# Patient Record
Sex: FEMALE | Race: WHITE | NOT HISPANIC OR LATINO | Employment: UNEMPLOYED | URBAN - METROPOLITAN AREA
[De-identification: names, ages, dates, MRNs, and addresses within clinical notes are randomized per-mention and may not be internally consistent; named-entity substitution may affect disease eponyms.]

---

## 2024-10-13 ENCOUNTER — APPOINTMENT (OUTPATIENT)
Dept: CARDIOLOGY | Facility: HOSPITAL | Age: 24
End: 2024-10-13

## 2024-10-13 ENCOUNTER — HOSPITAL ENCOUNTER (EMERGENCY)
Facility: HOSPITAL | Age: 24
Discharge: HOME | End: 2024-10-13

## 2024-10-13 ENCOUNTER — APPOINTMENT (OUTPATIENT)
Dept: RADIOLOGY | Facility: HOSPITAL | Age: 24
End: 2024-10-13

## 2024-10-13 VITALS
HEART RATE: 90 BPM | DIASTOLIC BLOOD PRESSURE: 78 MMHG | TEMPERATURE: 96.1 F | HEIGHT: 62 IN | RESPIRATION RATE: 20 BRPM | SYSTOLIC BLOOD PRESSURE: 121 MMHG | BODY MASS INDEX: 53.92 KG/M2 | OXYGEN SATURATION: 100 % | WEIGHT: 293 LBS

## 2024-10-13 DIAGNOSIS — R79.89 ELEVATED TSH: ICD-10-CM

## 2024-10-13 DIAGNOSIS — R00.2 PALPITATIONS: Primary | ICD-10-CM

## 2024-10-13 LAB
ANION GAP SERPL CALCULATED.3IONS-SCNC: 13 MMOL/L (ref 10–20)
B-HCG SERPL-ACNC: <2 MIU/ML
BASOPHILS # BLD AUTO: 0.04 X10*3/UL (ref 0–0.1)
BASOPHILS NFR BLD AUTO: 0.4 %
BUN SERPL-MCNC: 8 MG/DL (ref 6–23)
CALCIUM SERPL-MCNC: 9.7 MG/DL (ref 8.6–10.3)
CARDIAC TROPONIN I PNL SERPL HS: <3 NG/L (ref 0–13)
CHLORIDE SERPL-SCNC: 102 MMOL/L (ref 98–107)
CO2 SERPL-SCNC: 28 MMOL/L (ref 21–32)
CREAT SERPL-MCNC: 0.51 MG/DL (ref 0.5–1.05)
D DIMER PPP FEU-MCNC: 0.24 MG/L FEU (ref 0.19–0.5)
EGFRCR SERPLBLD CKD-EPI 2021: >90 ML/MIN/1.73M*2
EOSINOPHIL # BLD AUTO: 0.15 X10*3/UL (ref 0–0.7)
EOSINOPHIL NFR BLD AUTO: 1.4 %
ERYTHROCYTE [DISTWIDTH] IN BLOOD BY AUTOMATED COUNT: 13.1 % (ref 11.5–14.5)
GLUCOSE SERPL-MCNC: 108 MG/DL (ref 74–99)
HCT VFR BLD AUTO: 46.2 % (ref 36–46)
HGB BLD-MCNC: 15.6 G/DL (ref 12–16)
IMM GRANULOCYTES # BLD AUTO: 0.04 X10*3/UL (ref 0–0.7)
IMM GRANULOCYTES NFR BLD AUTO: 0.4 % (ref 0–0.9)
LYMPHOCYTES # BLD AUTO: 2.82 X10*3/UL (ref 1.2–4.8)
LYMPHOCYTES NFR BLD AUTO: 26.6 %
MCH RBC QN AUTO: 29.1 PG (ref 26–34)
MCHC RBC AUTO-ENTMCNC: 33.8 G/DL (ref 32–36)
MCV RBC AUTO: 86 FL (ref 80–100)
MONOCYTES # BLD AUTO: 0.77 X10*3/UL (ref 0.1–1)
MONOCYTES NFR BLD AUTO: 7.3 %
NEUTROPHILS # BLD AUTO: 6.8 X10*3/UL (ref 1.2–7.7)
NEUTROPHILS NFR BLD AUTO: 63.9 %
NRBC BLD-RTO: 0 /100 WBCS (ref 0–0)
PLATELET # BLD AUTO: 348 X10*3/UL (ref 150–450)
POTASSIUM SERPL-SCNC: 3.8 MMOL/L (ref 3.5–5.3)
RBC # BLD AUTO: 5.37 X10*6/UL (ref 4–5.2)
SODIUM SERPL-SCNC: 139 MMOL/L (ref 136–145)
T4 FREE SERPL-MCNC: 0.78 NG/DL (ref 0.61–1.12)
TSH SERPL-ACNC: 7.72 MIU/L (ref 0.44–3.98)
WBC # BLD AUTO: 10.6 X10*3/UL (ref 4.4–11.3)

## 2024-10-13 PROCEDURE — 36415 COLL VENOUS BLD VENIPUNCTURE: CPT

## 2024-10-13 PROCEDURE — 85300 ANTITHROMBIN III ACTIVITY: CPT

## 2024-10-13 PROCEDURE — 84702 CHORIONIC GONADOTROPIN TEST: CPT

## 2024-10-13 PROCEDURE — 2500000004 HC RX 250 GENERAL PHARMACY W/ HCPCS (ALT 636 FOR OP/ED)

## 2024-10-13 PROCEDURE — 96374 THER/PROPH/DIAG INJ IV PUSH: CPT

## 2024-10-13 PROCEDURE — 99284 EMERGENCY DEPT VISIT MOD MDM: CPT | Mod: 25

## 2024-10-13 PROCEDURE — 84484 ASSAY OF TROPONIN QUANT: CPT

## 2024-10-13 PROCEDURE — 85025 COMPLETE CBC W/AUTO DIFF WBC: CPT

## 2024-10-13 PROCEDURE — 71046 X-RAY EXAM CHEST 2 VIEWS: CPT

## 2024-10-13 PROCEDURE — 96361 HYDRATE IV INFUSION ADD-ON: CPT

## 2024-10-13 PROCEDURE — 84443 ASSAY THYROID STIM HORMONE: CPT

## 2024-10-13 PROCEDURE — 80048 BASIC METABOLIC PNL TOTAL CA: CPT

## 2024-10-13 PROCEDURE — 71046 X-RAY EXAM CHEST 2 VIEWS: CPT | Performed by: STUDENT IN AN ORGANIZED HEALTH CARE EDUCATION/TRAINING PROGRAM

## 2024-10-13 PROCEDURE — 2500000002 HC RX 250 W HCPCS SELF ADMINISTERED DRUGS (ALT 637 FOR MEDICARE OP, ALT 636 FOR OP/ED)

## 2024-10-13 PROCEDURE — 93005 ELECTROCARDIOGRAM TRACING: CPT

## 2024-10-13 PROCEDURE — 84439 ASSAY OF FREE THYROXINE: CPT

## 2024-10-13 RX ORDER — HYDROXYZINE PAMOATE 25 MG/1
25 CAPSULE ORAL ONCE
Status: COMPLETED | OUTPATIENT
Start: 2024-10-13 | End: 2024-10-13

## 2024-10-13 RX ORDER — LORAZEPAM 2 MG/ML
2 INJECTION INTRAMUSCULAR ONCE
Status: COMPLETED | OUTPATIENT
Start: 2024-10-13 | End: 2024-10-13

## 2024-10-13 ASSESSMENT — COLUMBIA-SUICIDE SEVERITY RATING SCALE - C-SSRS
1. IN THE PAST MONTH, HAVE YOU WISHED YOU WERE DEAD OR WISHED YOU COULD GO TO SLEEP AND NOT WAKE UP?: NO
2. HAVE YOU ACTUALLY HAD ANY THOUGHTS OF KILLING YOURSELF?: NO
6. HAVE YOU EVER DONE ANYTHING, STARTED TO DO ANYTHING, OR PREPARED TO DO ANYTHING TO END YOUR LIFE?: NO

## 2024-10-13 NOTE — ED PROVIDER NOTES
HPI   Chief Complaint   Patient presents with    Palpitations     Palpitations and dizziness. Increased but random over past week.  Pt symptoms increase with walking.         Patient is a 24-year-old female presenting with a chief complaint of palpitations.  Patient dates she is randomly had palpitations and some slight dizziness for the last 2 weeks, she believes there may be an anxiety component to this, she states that occasionally she will have ringing in her ears, patient states she feels very anxious at this moment, patient is has some chest discomfort that radiates into her ribs, patient has no current dizziness or blurred vision, no headache, no fevers or chills.  Patient does not take any medication daily.      History provided by:  Patient          Patient History   No past medical history on file.  Past Surgical History:   Procedure Laterality Date    TONSILLECTOMY  09/11/2017    Tonsillectomy With Adenoidectomy    TYMPANOSTOMY TUBE PLACEMENT  09/11/2017    Ear Pressure Equalization Tube     No family history on file.  Social History     Tobacco Use    Smoking status: Not on file    Smokeless tobacco: Not on file   Substance Use Topics    Alcohol use: Not on file    Drug use: Not on file       Physical Exam   ED Triage Vitals [10/13/24 0136]   Temperature Heart Rate Respirations BP   35.6 °C (96.1 °F) (!) 115 (!) 22 (!) 147/111      Pulse Ox Temp Source Heart Rate Source Patient Position   100 % Tympanic Monitor --      BP Location FiO2 (%)     -- --       Physical Exam  Vitals and nursing note reviewed. Exam conducted with a chaperone present.   Constitutional:       General: She is not in acute distress.     Appearance: Normal appearance. She is obese. She is not ill-appearing, toxic-appearing or diaphoretic.   HENT:      Head: Normocephalic and atraumatic.      Right Ear: Tympanic membrane, ear canal and external ear normal.      Left Ear: Tympanic membrane, ear canal and external ear normal.       Nose: Nose normal.      Mouth/Throat:      Mouth: Mucous membranes are moist.      Pharynx: Oropharynx is clear.   Eyes:      Extraocular Movements: Extraocular movements intact.      Conjunctiva/sclera: Conjunctivae normal.      Pupils: Pupils are equal, round, and reactive to light.   Cardiovascular:      Rate and Rhythm: Regular rhythm. Tachycardia present.      Pulses: Normal pulses.      Heart sounds: Normal heart sounds.   Pulmonary:      Breath sounds: Normal breath sounds.   Abdominal:      General: Abdomen is flat. Bowel sounds are normal.      Palpations: Abdomen is soft.   Musculoskeletal:         General: Normal range of motion.   Skin:     General: Skin is warm and dry.   Neurological:      General: No focal deficit present.      Mental Status: She is alert and oriented to person, place, and time. Mental status is at baseline.   Psychiatric:         Mood and Affect: Mood normal.         Behavior: Behavior normal.         Thought Content: Thought content normal.         Judgment: Judgment normal.           ED Course & [facility]   ED Course as of 10/13/24 2238   Sun Oct 13, 2024   0125 EKG interpreted by myself independently, EKG shows sinus tachycardia, rate of 107 bpm, UT interval 166, QRS 86, , QTc 461, patient has normal axis, no ST elevations or depressions, negative for acute MI. [HARRIS]      ED Course User Index  [HARRIS] Gregorio Worley, DO         Diagnoses as of 10/13/24 2238   Palpitations   Elevated TSH                 No data recorded     [place] Coma Scale Score: 15 (10/13/24 0140 : Angelica Calderón, HEVER)                           Medical Decision Making  Patient seen and evaluated at bedside, patient is in no acute distress.  I will order a CBC, CMP, troponin, D-dimer, TSH, chest x-ray. Differential diagnosis includes but is not limited to anxiety, electrolyte abnormality, ACS.  Patient CBC shows hemoglobin of 15.6 hematocrit 46.2, patient's TSH slightly elevated at 7.72, however thyroxine free within  normal limits, BMP shows no electrolyte abnormalities, D-dimer 0.24, pregnancy negative, patient's x-ray chest as below.  Discussed findings with the patient, advised her to follow-up with her primary care provider, after IV fluids, patient's tachycardia did resolve.  Return precautions were discussed with the patient, she is agreeable this discharge plan.    Diagnosis: Palpitations, elevated TSH  XR chest 2 views   Final Result    No acute cardiopulmonary process.                MACRO:    None.          Signed by: Sabino Evans 10/13/2024 3:36 AM    Dictation workstation:   [id number]     Results for orders placed or performed during the hospital encounter of 10/13/24  -hCG, quantitative, pregnancy:        Result                      Value             Ref Range           HCG, Beta-Quantitative      <2                <5 mIU/mL      -Basic metabolic panel:        Result                      Value             Ref Range           Glucose                     108 (H)           74 - 99 mg/dL       Sodium                      139               136 - 145 mm*       Potassium                   3.8               3.5 - 5.3 mm*       Chloride                    102               98 - 107 mmo*       Bicarbonate                 28                21 - 32 mmol*       Anion Gap                   13                10 - 20 mmol*       Urea Nitrogen               8                 6 - 23 mg/dL        Creatinine                  0.51              0.50 - 1.05 *       eGFR                        >90               >60 mL/min/1*       Calcium                     9.7               8.6 - 10.3 m*  -CBC and Auto Differential:        Result                      Value             Ref Range           WBC                         10.6              4.4 - 11.3 x*       nRBC                        0.0               0.0 - 0.0 /1*       RBC                         5.37 (H)          4.00 - 5.20 *       Hemoglobin                  15.6               12.0 - 16.0 *       Hematocrit                  46.2 (H)          36.0 - 46.0 %       MCV                         86                80 - 100 fL         MCH                         29.1              26.0 - 34.0 *       MCHC                        33.8              32.0 - 36.0 *       RDW                         13.1              11.5 - 14.5 %       Platelets                   348               150 - 450 x1*       Neutrophils %               63.9              40.0 - 80.0 %       Immature Granulocytes *     0.4               0.0 - 0.9 %         Lymphocytes %               26.6              13.0 - 44.0 %       Monocytes %                 7.3               2.0 - 10.0 %        Eosinophils %               1.4               0.0 - 6.0 %         Basophils %                 0.4               0.0 - 2.0 %         Neutrophils Absolute        6.80              1.20 - 7.70 *       Immature Granulocytes *     0.04              0.00 - 0.70 *       Lymphocytes Absolute        2.82              1.20 - 4.80 *       Monocytes Absolute          0.77              0.10 - 1.00 *       Eosinophils Absolute        0.15              0.00 - 0.70 *       Basophils Absolute          0.04              0.00 - 0.10 *  -D-dimer, quantitative:        Result                      Value             Ref Range           D-Dimer Non VTE, Quant*     0.24              0.19 - 0.50 *  -Troponin I, High Sensitivity:        Result                      Value             Ref Range           Troponin I, High Sensi*     <3                0 - 13 ng/L    -TSH with reflex to Free T4 if abnormal:        Result                      Value             Ref Range           Thyroid Stimulating Ho*     7.72 (H)          0.44 - 3.98 *  -Thyroxine, Free:        Result                      Value             Ref Range           Thyroxine, Free             0.78              0.61 - 1.12 *  I utilized an evidence-based risk rating tool (CMT) along with my training and experience to  weigh the risk of discharge against the risks of further testing, imaging, or hospitalization. At this time I estimate the risks of additional testing, imaging, or hospitalization to be equal to or greater than the risk of discharge. I discussed my risk assessment with the patient and the patient consents to the risk of discharge as well as the risk of uncertainty in estimating outcomes. [id number]       [image]          Procedure  Procedures    Sections of this report were created using voice-to-text technology and may contain errors in translation    Gregorio Worley DO  Emergency Medicine         Gregorio Worley DO  10/13/24 4639

## 2024-10-13 NOTE — Clinical Note
Sherice Brown was seen and treated in our emergency department on 10/13/2024.  She may return to work on 10/14/2024.       If you have any questions or concerns, please don't hesitate to call.      Gregorio Worley, DO

## 2024-10-13 NOTE — DISCHARGE INSTRUCTIONS
You are seen today for palpitations, your imaging and lab work are reassuring, please note that your TSH was slightly elevated, be beneficial for you to follow-up with your primary care provider for further evaluation, please return to the ER for worsening symptoms.

## 2024-10-14 ENCOUNTER — HOSPITAL ENCOUNTER (EMERGENCY)
Facility: HOSPITAL | Age: 24
Discharge: HOME | End: 2024-10-14
Attending: STUDENT IN AN ORGANIZED HEALTH CARE EDUCATION/TRAINING PROGRAM

## 2024-10-14 ENCOUNTER — APPOINTMENT (OUTPATIENT)
Dept: RADIOLOGY | Facility: HOSPITAL | Age: 24
End: 2024-10-14

## 2024-10-14 VITALS
OXYGEN SATURATION: 100 % | WEIGHT: 293 LBS | BODY MASS INDEX: 53.92 KG/M2 | HEART RATE: 105 BPM | RESPIRATION RATE: 18 BRPM | DIASTOLIC BLOOD PRESSURE: 87 MMHG | HEIGHT: 62 IN | TEMPERATURE: 97.2 F | SYSTOLIC BLOOD PRESSURE: 177 MMHG

## 2024-10-14 DIAGNOSIS — R10.9 FLANK PAIN: Primary | ICD-10-CM

## 2024-10-14 DIAGNOSIS — R42 DIZZINESS: ICD-10-CM

## 2024-10-14 DIAGNOSIS — F41.9 ANXIETY: ICD-10-CM

## 2024-10-14 LAB
ALBUMIN SERPL BCP-MCNC: 4.6 G/DL (ref 3.4–5)
ALP SERPL-CCNC: 100 U/L (ref 33–110)
ALT SERPL W P-5'-P-CCNC: 45 U/L (ref 7–45)
ANION GAP SERPL CALCULATED.3IONS-SCNC: 14 MMOL/L (ref 10–20)
APPEARANCE UR: CLEAR
AST SERPL W P-5'-P-CCNC: 26 U/L (ref 9–39)
BACTERIA #/AREA URNS AUTO: ABNORMAL /HPF
BASOPHILS # BLD AUTO: 0.02 X10*3/UL (ref 0–0.1)
BASOPHILS NFR BLD AUTO: 0.2 %
BILIRUB SERPL-MCNC: 0.6 MG/DL (ref 0–1.2)
BILIRUB UR STRIP.AUTO-MCNC: NEGATIVE MG/DL
BUN SERPL-MCNC: 7 MG/DL (ref 6–23)
CALCIUM SERPL-MCNC: 9.7 MG/DL (ref 8.6–10.3)
CARDIAC TROPONIN I PNL SERPL HS: <3 NG/L (ref 0–13)
CHLORIDE SERPL-SCNC: 102 MMOL/L (ref 98–107)
CO2 SERPL-SCNC: 27 MMOL/L (ref 21–32)
COLOR UR: ABNORMAL
CREAT SERPL-MCNC: 0.57 MG/DL (ref 0.5–1.05)
EGFRCR SERPLBLD CKD-EPI 2021: >90 ML/MIN/1.73M*2
EOSINOPHIL # BLD AUTO: 0.08 X10*3/UL (ref 0–0.7)
EOSINOPHIL NFR BLD AUTO: 0.8 %
ERYTHROCYTE [DISTWIDTH] IN BLOOD BY AUTOMATED COUNT: 13.2 % (ref 11.5–14.5)
GLUCOSE SERPL-MCNC: 89 MG/DL (ref 74–99)
GLUCOSE UR STRIP.AUTO-MCNC: NORMAL MG/DL
HCG UR QL IA.RAPID: NEGATIVE
HCT VFR BLD AUTO: 47.2 % (ref 36–46)
HGB BLD-MCNC: 15.8 G/DL (ref 12–16)
IMM GRANULOCYTES # BLD AUTO: 0.03 X10*3/UL (ref 0–0.7)
IMM GRANULOCYTES NFR BLD AUTO: 0.3 % (ref 0–0.9)
KETONES UR STRIP.AUTO-MCNC: NEGATIVE MG/DL
LEUKOCYTE ESTERASE UR QL STRIP.AUTO: NEGATIVE
LIPASE SERPL-CCNC: 14 U/L (ref 9–82)
LYMPHOCYTES # BLD AUTO: 2 X10*3/UL (ref 1.2–4.8)
LYMPHOCYTES NFR BLD AUTO: 20.1 %
MCH RBC QN AUTO: 28.9 PG (ref 26–34)
MCHC RBC AUTO-ENTMCNC: 33.5 G/DL (ref 32–36)
MCV RBC AUTO: 86 FL (ref 80–100)
MONOCYTES # BLD AUTO: 0.64 X10*3/UL (ref 0.1–1)
MONOCYTES NFR BLD AUTO: 6.4 %
MUCOUS THREADS #/AREA URNS AUTO: ABNORMAL /LPF
NEUTROPHILS # BLD AUTO: 7.19 X10*3/UL (ref 1.2–7.7)
NEUTROPHILS NFR BLD AUTO: 72.2 %
NITRITE UR QL STRIP.AUTO: NEGATIVE
NRBC BLD-RTO: 0 /100 WBCS (ref 0–0)
PH UR STRIP.AUTO: 5.5 [PH]
PLATELET # BLD AUTO: 371 X10*3/UL (ref 150–450)
POTASSIUM SERPL-SCNC: 3.8 MMOL/L (ref 3.5–5.3)
PROT SERPL-MCNC: 8.5 G/DL (ref 6.4–8.2)
PROT UR STRIP.AUTO-MCNC: NEGATIVE MG/DL
RBC # BLD AUTO: 5.46 X10*6/UL (ref 4–5.2)
RBC # UR STRIP.AUTO: ABNORMAL /UL
RBC #/AREA URNS AUTO: ABNORMAL /HPF
SODIUM SERPL-SCNC: 139 MMOL/L (ref 136–145)
SP GR UR STRIP.AUTO: 1.01
SQUAMOUS #/AREA URNS AUTO: ABNORMAL /HPF
UROBILINOGEN UR STRIP.AUTO-MCNC: NORMAL MG/DL
WBC # BLD AUTO: 10 X10*3/UL (ref 4.4–11.3)
WBC #/AREA URNS AUTO: ABNORMAL /HPF

## 2024-10-14 PROCEDURE — 85025 COMPLETE CBC W/AUTO DIFF WBC: CPT

## 2024-10-14 PROCEDURE — 96374 THER/PROPH/DIAG INJ IV PUSH: CPT | Mod: 59

## 2024-10-14 PROCEDURE — 36415 COLL VENOUS BLD VENIPUNCTURE: CPT

## 2024-10-14 PROCEDURE — 71275 CT ANGIOGRAPHY CHEST: CPT | Performed by: RADIOLOGY

## 2024-10-14 PROCEDURE — 99285 EMERGENCY DEPT VISIT HI MDM: CPT | Mod: 25

## 2024-10-14 PROCEDURE — 74177 CT ABD & PELVIS W/CONTRAST: CPT | Performed by: RADIOLOGY

## 2024-10-14 PROCEDURE — 2550000001 HC RX 255 CONTRASTS

## 2024-10-14 PROCEDURE — 84484 ASSAY OF TROPONIN QUANT: CPT

## 2024-10-14 PROCEDURE — 74177 CT ABD & PELVIS W/CONTRAST: CPT

## 2024-10-14 PROCEDURE — 80053 COMPREHEN METABOLIC PANEL: CPT

## 2024-10-14 PROCEDURE — 81025 URINE PREGNANCY TEST: CPT

## 2024-10-14 PROCEDURE — 83690 ASSAY OF LIPASE: CPT

## 2024-10-14 PROCEDURE — 81003 URINALYSIS AUTO W/O SCOPE: CPT

## 2024-10-14 PROCEDURE — 2500000004 HC RX 250 GENERAL PHARMACY W/ HCPCS (ALT 636 FOR OP/ED)

## 2024-10-14 PROCEDURE — 71275 CT ANGIOGRAPHY CHEST: CPT

## 2024-10-14 RX ORDER — KETOROLAC TROMETHAMINE 10 MG/1
10 TABLET, FILM COATED ORAL EVERY 6 HOURS PRN
Qty: 20 TABLET | Refills: 0 | Status: SHIPPED | OUTPATIENT
Start: 2024-10-14 | End: 2024-10-19

## 2024-10-14 RX ORDER — ALPRAZOLAM 0.5 MG/1
0.5 TABLET ORAL 3 TIMES DAILY PRN
Qty: 9 TABLET | Refills: 0 | Status: SHIPPED | OUTPATIENT
Start: 2024-10-14 | End: 2024-10-17

## 2024-10-14 RX ORDER — CYCLOBENZAPRINE HCL 10 MG
10 TABLET ORAL 2 TIMES DAILY PRN
Qty: 20 TABLET | Refills: 0 | Status: SHIPPED | OUTPATIENT
Start: 2024-10-14 | End: 2024-10-24

## 2024-10-14 RX ORDER — KETOROLAC TROMETHAMINE 30 MG/ML
15 INJECTION, SOLUTION INTRAMUSCULAR; INTRAVENOUS ONCE
Status: COMPLETED | OUTPATIENT
Start: 2024-10-14 | End: 2024-10-14

## 2024-10-14 RX ORDER — HYDROXYZINE HYDROCHLORIDE 25 MG/1
25 TABLET, FILM COATED ORAL EVERY 6 HOURS
Qty: 12 TABLET | Refills: 0 | Status: SHIPPED | OUTPATIENT
Start: 2024-10-14 | End: 2024-10-17

## 2024-10-14 ASSESSMENT — COLUMBIA-SUICIDE SEVERITY RATING SCALE - C-SSRS
1. IN THE PAST MONTH, HAVE YOU WISHED YOU WERE DEAD OR WISHED YOU COULD GO TO SLEEP AND NOT WAKE UP?: NO
6. HAVE YOU EVER DONE ANYTHING, STARTED TO DO ANYTHING, OR PREPARED TO DO ANYTHING TO END YOUR LIFE?: NO
2. HAVE YOU ACTUALLY HAD ANY THOUGHTS OF KILLING YOURSELF?: NO

## 2024-10-14 ASSESSMENT — PAIN SCALES - GENERAL: PAINLEVEL_OUTOF10: 4

## 2024-10-14 NOTE — Clinical Note
Sherice Brown was seen and treated in our emergency department on 10/14/2024.  She may return to work on 10/21/2024.       If you have any questions or concerns, please don't hesitate to call.      Katelynn Krishnamurthy MD

## 2024-10-14 NOTE — ED PROVIDER NOTES
HPI   Chief Complaint   Patient presents with    Dizziness    Flank Pain       HPI  Patient is a 24-year-old female who presents to ED for multiple complaints.  Patient states she is dizzy, has left-sided flank pain that radiates from the left side of her chest, generalized malaise.  Patient was evaluated in the ED yesterday for chief complaint of palpitations.  Patient had a full cardiac workup which was negative.  Patient was treated for anxiety and discharge.  Patient returns today with similar complaints.  She denies any nausea vomiting or diarrhea, urinary symptoms, headache or dizziness, fever chills, cough or congestion.  Denies any recent hospitalizations or surgeries.  Denies any recent travel or sick contacts.  Patient does have a history of anxiety.      Patient History   No past medical history on file.  Past Surgical History:   Procedure Laterality Date    TONSILLECTOMY  09/11/2017    Tonsillectomy With Adenoidectomy    TYMPANOSTOMY TUBE PLACEMENT  09/11/2017    Ear Pressure Equalization Tube     No family history on file.  Social History     Tobacco Use    Smoking status: Not on file    Smokeless tobacco: Not on file   Substance Use Topics    Alcohol use: Not on file    Drug use: Not on file       Physical Exam   ED Triage Vitals [10/14/24 1540]   Temperature Heart Rate Respirations BP   36.2 °C (97.2 °F) (!) 130 16 (!) 198/99      Pulse Ox Temp Source Heart Rate Source Patient Position   100 % Temporal Monitor;Brachial Sitting      BP Location FiO2 (%)     Left arm --       Physical Exam  Vitals reviewed.   Constitutional:       General: She is not in acute distress.     Appearance: Normal appearance. She is obese. She is not ill-appearing.   HENT:      Head: Normocephalic and atraumatic.   Eyes:      Extraocular Movements: Extraocular movements intact.   Cardiovascular:      Rate and Rhythm: Regular rhythm. Tachycardia present.      Heart sounds: Normal heart sounds.   Pulmonary:      Effort:  Pulmonary effort is normal.      Breath sounds: Normal breath sounds.   Abdominal:      General: Abdomen is flat.      Palpations: Abdomen is soft.      Tenderness: There is no abdominal tenderness.   Musculoskeletal:         General: Normal range of motion.      Cervical back: Normal range of motion and neck supple.   Skin:     General: Skin is warm and dry.   Neurological:      General: No focal deficit present.      Mental Status: She is alert and oriented to person, place, and time.   Psychiatric:         Mood and Affect: Mood normal.         Behavior: Behavior normal.           ED Course & MDM   Diagnoses as of 10/14/24 2126   Flank pain   Dizziness   Anxiety                 No data recorded     [place] Coma Scale Score: 15 (10/14/24 1540 : My Melgoza, RN)                           Medical Decision Making  Parts of this chart have been completed using voice recognition software. Please excuse any errors of transcription.  My thought process and reason for plan has been formulated from the time that I saw the patient until the time of disposition and is not specific to one specific moment during their visit and furthermore my MDM encompasses this entire chart and not only this text box.    HPI:   Detailed above.    Exam:   A medically appropriate exam performed, outlined above, given the known history and presentation.    History obtained from:   Patient    EKG/Cardiac monitor:   Interpreted by attending physician, see their note for ED course for more detail.    Social Determinants of Health considered during this visit:       Medications given during visit:  Medications   ketorolac (Toradol) injection 15 mg (15 mg intravenous Given 10/14/24 1605)   iohexol (OMNIPaque) 350 mg iodine/mL solution 75 mL (75 mL intravenous Given 10/14/24 1618)        Diagnostic/tests:  Labs Reviewed   CBC WITH AUTO DIFFERENTIAL - Abnormal       Result Value    WBC 10.0      nRBC 0.0      RBC 5.46 (*)     Hemoglobin 15.8       Hematocrit 47.2 (*)     MCV 86      MCH 28.9      MCHC 33.5      RDW 13.2      Platelets 371      Neutrophils % 72.2      Immature Granulocytes %, Automated 0.3      Lymphocytes % 20.1      Monocytes % 6.4      Eosinophils % 0.8      Basophils % 0.2      Neutrophils Absolute 7.19      Immature Granulocytes Absolute, Automated 0.03      Lymphocytes Absolute 2.00      Monocytes Absolute 0.64      Eosinophils Absolute 0.08      Basophils Absolute 0.02     COMPREHENSIVE METABOLIC PANEL - Abnormal    Glucose 89      Sodium 139      Potassium 3.8      Chloride 102      Bicarbonate 27      Anion Gap 14      Urea Nitrogen 7      Creatinine 0.57      eGFR >90      Calcium 9.7      Albumin 4.6      Alkaline Phosphatase 100      Total Protein 8.5 (*)     AST 26      Bilirubin, Total 0.6      ALT 45     URINALYSIS WITH REFLEX CULTURE AND MICROSCOPIC - Abnormal    Color, Urine Light-Yellow      Appearance, Urine Clear      Specific Gravity, Urine 1.011      pH, Urine 5.5      Protein, Urine NEGATIVE      Glucose, Urine Normal      Blood, Urine 1.0 (3+) (*)     Ketones, Urine NEGATIVE      Bilirubin, Urine NEGATIVE      Urobilinogen, Urine Normal      Nitrite, Urine NEGATIVE      Leukocyte Esterase, Urine NEGATIVE     URINALYSIS MICROSCOPIC WITH REFLEX CULTURE - Abnormal    WBC, Urine 1-5      RBC, Urine 1-2      Squamous Epithelial Cells, Urine 1-9 (SPARSE)      Bacteria, Urine 1+ (*)     Mucus, Urine FEW     LIPASE - Normal    Lipase 14      Narrative:     Venipuncture immediately after or during the administration of Metamizole may lead to falsely low results. Testing should be performed immediately prior to Metamizole dosing.   HCG, URINE, QUALITATIVE - Normal    HCG, Urine NEGATIVE     SERIAL TROPONIN-INITIAL - Normal    Troponin I, High Sensitivity <3      Narrative:     Less than 99th percentile of normal range cutoff-  Female and children under 18 years old <14 ng/L; Male <21 ng/L: Negative  Repeat testing should be  performed if clinically indicated.     Female and children under 18 years old 14-50 ng/L; Male 21-50 ng/L:  Consistent with possible cardiac damage and possible increased clinical   risk. Serial measurements may help to assess extent of myocardial damage.     >50 ng/L: Consistent with cardiac damage, increased clinical risk and  myocardial infarction. Serial measurements may help assess extent of   myocardial damage.      NOTE: Children less than 1 year old may have higher baseline troponin   levels and results should be interpreted in conjunction with the overall   clinical context.     NOTE: Troponin I testing is performed using a different   testing methodology at [facility] than at other   [facility]. Direct result comparisons should only   be made within the same method.   TROPONIN SERIES- (INITIAL, 1 HR)    Narrative:     The following orders were created for panel order Troponin I Series, High Sensitivity (0, 1 HR).  Procedure                               Abnormality         Status                     ---------                               -----------         ------                     Troponin I, High Sensiti...[[id number]]  Normal              Final result               Troponin, High Sensitivi...[[id number]]                                                   Please view results for these tests on the individual orders.   URINALYSIS WITH REFLEX CULTURE AND MICROSCOPIC    Narrative:     The following orders were created for panel order Urinalysis with Reflex Culture and Microscopic.  Procedure                               Abnormality         Status                     ---------                               -----------         ------                     Urinalysis with Reflex C...[[id number]]  Abnormal            Final result               Extra Urine Gray Tube[[id number]]                            In process                   Please view results for these tests on the individual orders.    EXTRA URINE GRAY TUBE   SERIAL TROPONIN, 1 HOUR      CT angio chest for pulmonary embolism   Final Result   1. No pulmonary embolus.   2. No acute intrathoracic process.             Signed by: Vaibhav Lozoya 10/14/2024 5:09 PM   Dictation workstation:   [id number]      CT abdomen pelvis w IV contrast   Final Result   No CT evidence for acute pathology within the abdomen or pelvis.        Signed by: Vaibhav Lozoya 10/14/2024 5:02 PM   Dictation workstation:   [id number]           [facility] Summary:  Workup and imaging are both unremarkable.  CTA is negative for PE.  CT of abdomen shows no intra-abdominal process.  Patient likely suffering from anxiety.  Will prescribe short term prescription of Xanax and hydroxyzine.    We have discussed the diagnosis and risks, and we agree with discharging home to follow-up with appropriate physician as directed. We also discussed returning to the Emergency Department immediately if new or worsening symptoms occur. We have discussed the symptoms which are most concerning that necessitate immediate return. Pt symptoms have been well controlled here and the patient is safe for discharge with appropriate outpatient follow up. The patient has verbalized understanding to return to ER without delay for new or worsening pains or for any other symptoms or concerns. I utilized the discharge clinical management tool provided [employer] to help estimate risk of negative outcome for this patient.        Procedure  Procedures     Eduardo Escobedo PA-C  10/14/24 2122

## 2024-10-15 LAB
ATRIAL RATE: 107 BPM
HOLD SPECIMEN: NORMAL
P AXIS: 46 DEGREES
P OFFSET: 187 MS
P ONSET: 132 MS
PR INTERVAL: 166 MS
Q ONSET: 215 MS
QRS COUNT: 18 BEATS
QRS DURATION: 86 MS
QT INTERVAL: 346 MS
QTC CALCULATION(BAZETT): 461 MS
QTC FREDERICIA: 419 MS
R AXIS: 34 DEGREES
T AXIS: 23 DEGREES
T OFFSET: 388 MS
VENTRICULAR RATE: 107 BPM

## 2025-05-13 ENCOUNTER — HOSPITAL ENCOUNTER (EMERGENCY)
Facility: HOSPITAL | Age: 25
Discharge: HOME | End: 2025-05-13

## 2025-05-13 ENCOUNTER — APPOINTMENT (OUTPATIENT)
Dept: RADIOLOGY | Facility: HOSPITAL | Age: 25
End: 2025-05-13

## 2025-05-13 VITALS
SYSTOLIC BLOOD PRESSURE: 109 MMHG | BODY MASS INDEX: 53.92 KG/M2 | HEART RATE: 97 BPM | WEIGHT: 293 LBS | DIASTOLIC BLOOD PRESSURE: 87 MMHG | HEIGHT: 62 IN | RESPIRATION RATE: 18 BRPM | OXYGEN SATURATION: 99 % | TEMPERATURE: 98.1 F
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
## 2025-05-13 DIAGNOSIS — S99.919A ANKLE INJURY, UNSPECIFIED LATERALITY, INITIAL ENCOUNTER: ICD-10-CM

## 2025-05-13 PROCEDURE — 73610 X-RAY EXAM OF ANKLE: CPT | Mod: LEFT SIDE | Performed by: STUDENT IN AN ORGANIZED HEALTH CARE EDUCATION/TRAINING PROGRAM

## 2025-05-13 PROCEDURE — 2500000001 HC RX 250 WO HCPCS SELF ADMINISTERED DRUGS (ALT 637 FOR MEDICARE OP): Performed by: PHYSICIAN ASSISTANT

## 2025-05-13 PROCEDURE — 73610 X-RAY EXAM OF ANKLE: CPT | Mod: LT

## 2025-05-13 PROCEDURE — 99283 EMERGENCY DEPT VISIT LOW MDM: CPT

## 2025-05-13 RX ORDER — IBUPROFEN 400 MG/1
400 TABLET ORAL ONCE
Status: COMPLETED | OUTPATIENT
Start: 2025-05-13 | End: 2025-05-13

## 2025-05-13 RX ORDER — IBUPROFEN 400 MG/1
400 TABLET ORAL EVERY 6 HOURS PRN
Qty: 20 TABLET | Refills: 0 | Status: SHIPPED | OUTPATIENT
Start: 2025-05-13 | End: 2025-05-20

## 2025-05-13 RX ADMIN — IBUPROFEN 400 MG: 400 TABLET, FILM COATED ORAL at 18:22

## 2025-05-13 ASSESSMENT — PAIN DESCRIPTION - LOCATION: LOCATION: LEG

## 2025-05-13 ASSESSMENT — PAIN DESCRIPTION - ORIENTATION: ORIENTATION: LEFT

## 2025-05-13 ASSESSMENT — PAIN - FUNCTIONAL ASSESSMENT: PAIN_FUNCTIONAL_ASSESSMENT: 0-10

## 2025-05-13 ASSESSMENT — PAIN SCALES - GENERAL: PAINLEVEL_OUTOF10: 3

## 2025-05-13 NOTE — ED PROVIDER NOTES
HPI   Chief Complaint   Patient presents with    Fall     Patient reports falling down 3 stairs which were wet, injured left leg.         24-year-old female presenting to the emergency department with a chief complaint of pain in her left ankle.  States she tripped on the stairs hitting her ankle.  She is able to walk but having some pain with weightbearing.  Well-appearing nontoxic afebrile.  Denies any other injury or trauma.  She did take acetaminophen prior to arrival.  No other complaint.              Patient History   Medical History[1]  Surgical History[2]  Family History[3]  Social History[4]    Physical Exam   ED Triage Vitals [05/13/25 1756]   Temperature Heart Rate Respirations BP   36.7 °C (98.1 °F) (!) 113 15 118/79      Pulse Ox Temp src Heart Rate Source Patient Position   98 % -- -- --      BP Location FiO2 (%)     -- --       Physical Exam  Vitals and nursing note reviewed.   Constitutional:       Appearance: Normal appearance.   HENT:      Head: Normocephalic.      Nose: Nose normal.      Mouth/Throat:      Mouth: Mucous membranes are moist.   Cardiovascular:      Rate and Rhythm: Normal rate and regular rhythm.      Pulses: Normal pulses.   Pulmonary:      Effort: Pulmonary effort is normal.      Breath sounds: Normal breath sounds.   Abdominal:      General: Abdomen is flat.   Musculoskeletal:      Cervical back: Normal range of motion.      Comments:   Tenderness to left anterior ankle, able to flex and extend   Skin:     General: Skin is warm.   Neurological:      General: No focal deficit present.      Mental Status: She is alert and oriented to person, place, and time.   Psychiatric:         Mood and Affect: Mood normal.           ED Course & MDM   Diagnoses as of 05/13/25 1902   Fall, initial encounter   Ankle injury, unspecified laterality, initial encounter                 No data recorded     [place] Coma Scale Score: 15 (05/13/25 1756 : Jaz Warren RN)                            Medical Decision Making  I have seen and evaluated this patient.  Physician available for consultation.  Vital signs have been reviewed.  All laboratory and diagnostic imaging is reviewed by myself and interpreted by myself unless otherwise stated.  Additionally imaging is interpreted by radiologist.      X-ray negative.  Able to flex and extend.  Given Aircast.  Anti-inflammatory medicine.  Outpatient follow-up.  Released in stable condition.          Labs Reviewed - No data to display  XR ankle left 3+ views   Final Result   Soft tissue swelling overlies the medial malleolus of the left ankle   without evidence of fracture or traumatic malalignment.             MACRO:   None        Signed by: Jenn Zheng 5/13/2025 6:33 PM   Dictation workstation:   [id number]        Medications   ibuprofen tablet 400 mg (400 mg oral Given 5/13/25 5922)     Current Discharge Medication List        START taking these medications    Details   ibuprofen 400 mg tablet Take 1 tablet (400 mg) by mouth every 6 hours if needed for moderate pain (4 - 6) for up to 7 days.  Qty: 20 tablet, Refills: 0    Associated Diagnoses: Fall, initial encounter; Ankle injury, unspecified laterality, initial encounter               Procedure  Procedures       [1] History reviewed. No pertinent past medical history.  [2]   Past Surgical History:  Procedure Laterality Date    TONSILLECTOMY  09/11/2017    Tonsillectomy With Adenoidectomy    TYMPANOSTOMY TUBE PLACEMENT  09/11/2017    Ear Pressure Equalization Tube   [3] No family history on file.  [4]   Social History  Tobacco Use    Smoking status: Never    Smokeless tobacco: Never   Vaping Use    Vaping status: Some Days   Substance Use Topics    Alcohol use: Never    Drug use: Never        Parth Medina PA-C  05/13/25 4922

## 2025-07-28 ENCOUNTER — HOSPITAL ENCOUNTER (EMERGENCY)
Facility: HOSPITAL | Age: 25
Discharge: HOME | End: 2025-07-28
Attending: EMERGENCY MEDICINE

## 2025-07-28 ENCOUNTER — APPOINTMENT (OUTPATIENT)
Dept: RADIOLOGY | Facility: HOSPITAL | Age: 25
End: 2025-07-28

## 2025-07-28 VITALS
HEIGHT: 62 IN | RESPIRATION RATE: 16 BRPM | SYSTOLIC BLOOD PRESSURE: 156 MMHG | WEIGHT: 293 LBS | BODY MASS INDEX: 53.92 KG/M2 | HEART RATE: 100 BPM | OXYGEN SATURATION: 99 % | TEMPERATURE: 97.7 F | DIASTOLIC BLOOD PRESSURE: 90 MMHG

## 2025-07-28 DIAGNOSIS — R06.02 SHORTNESS OF BREATH: Primary | ICD-10-CM

## 2025-07-28 LAB
ALBUMIN SERPL BCP-MCNC: 4.2 G/DL (ref 3.4–5)
ALP SERPL-CCNC: 89 U/L (ref 33–110)
ALT SERPL W P-5'-P-CCNC: 15 U/L (ref 7–45)
ANION GAP SERPL CALCULATED.3IONS-SCNC: 13 MMOL/L (ref 10–20)
AST SERPL W P-5'-P-CCNC: 11 U/L (ref 9–39)
B-HCG SERPL-ACNC: <2 MIU/ML
BASOPHILS # BLD AUTO: 0.02 X10*3/UL (ref 0–0.1)
BASOPHILS NFR BLD AUTO: 0.2 %
BILIRUB SERPL-MCNC: 0.4 MG/DL (ref 0–1.2)
BUN SERPL-MCNC: 11 MG/DL (ref 6–23)
CALCIUM SERPL-MCNC: 9.7 MG/DL (ref 8.6–10.3)
CARDIAC TROPONIN I PNL SERPL HS: <3 NG/L (ref 0–13)
CARDIAC TROPONIN I PNL SERPL HS: <3 NG/L (ref 0–13)
CHLORIDE SERPL-SCNC: 107 MMOL/L (ref 98–107)
CO2 SERPL-SCNC: 23 MMOL/L (ref 21–32)
CREAT SERPL-MCNC: 0.54 MG/DL (ref 0.5–1.05)
D DIMER PPP FEU-MCNC: 0.27 MG/L FEU (ref 0.19–0.5)
EGFRCR SERPLBLD CKD-EPI 2021: >90 ML/MIN/1.73M*2
EOSINOPHIL # BLD AUTO: 0.09 X10*3/UL (ref 0–0.7)
EOSINOPHIL NFR BLD AUTO: 0.9 %
ERYTHROCYTE [DISTWIDTH] IN BLOOD BY AUTOMATED COUNT: 16.2 % (ref 11.5–14.5)
GLUCOSE SERPL-MCNC: 84 MG/DL (ref 74–99)
HCT VFR BLD AUTO: 37 % (ref 36–46)
HGB BLD-MCNC: 11.3 G/DL (ref 12–16)
IMM GRANULOCYTES # BLD AUTO: 0.06 X10*3/UL (ref 0–0.7)
IMM GRANULOCYTES NFR BLD AUTO: 0.6 % (ref 0–0.9)
LYMPHOCYTES # BLD AUTO: 1.91 X10*3/UL (ref 1.2–4.8)
LYMPHOCYTES NFR BLD AUTO: 18.4 %
MCH RBC QN AUTO: 22.6 PG (ref 26–34)
MCHC RBC AUTO-ENTMCNC: 30.5 G/DL (ref 32–36)
MCV RBC AUTO: 74 FL (ref 80–100)
MONOCYTES # BLD AUTO: 0.81 X10*3/UL (ref 0.1–1)
MONOCYTES NFR BLD AUTO: 7.8 %
NEUTROPHILS # BLD AUTO: 7.47 X10*3/UL (ref 1.2–7.7)
NEUTROPHILS NFR BLD AUTO: 72.1 %
NRBC BLD-RTO: 0 /100 WBCS (ref 0–0)
PLATELET # BLD AUTO: 404 X10*3/UL (ref 150–450)
POTASSIUM SERPL-SCNC: 4 MMOL/L (ref 3.5–5.3)
PROT SERPL-MCNC: 7.7 G/DL (ref 6.4–8.2)
RBC # BLD AUTO: 5 X10*6/UL (ref 4–5.2)
SODIUM SERPL-SCNC: 139 MMOL/L (ref 136–145)
T4 FREE SERPL-MCNC: 0.79 NG/DL (ref 0.61–1.12)
TSH SERPL-ACNC: 4.14 MIU/L (ref 0.44–3.98)
WBC # BLD AUTO: 10.4 X10*3/UL (ref 4.4–11.3)

## 2025-07-28 PROCEDURE — 84484 ASSAY OF TROPONIN QUANT: CPT | Performed by: PHYSICIAN ASSISTANT

## 2025-07-28 PROCEDURE — 84702 CHORIONIC GONADOTROPIN TEST: CPT | Performed by: PHYSICIAN ASSISTANT

## 2025-07-28 PROCEDURE — 71046 X-RAY EXAM CHEST 2 VIEWS: CPT

## 2025-07-28 PROCEDURE — 36415 COLL VENOUS BLD VENIPUNCTURE: CPT | Performed by: PHYSICIAN ASSISTANT

## 2025-07-28 PROCEDURE — 94640 AIRWAY INHALATION TREATMENT: CPT

## 2025-07-28 PROCEDURE — 71046 X-RAY EXAM CHEST 2 VIEWS: CPT | Performed by: RADIOLOGY

## 2025-07-28 PROCEDURE — 99284 EMERGENCY DEPT VISIT MOD MDM: CPT | Mod: 25 | Performed by: EMERGENCY MEDICINE

## 2025-07-28 PROCEDURE — 2500000002 HC RX 250 W HCPCS SELF ADMINISTERED DRUGS (ALT 637 FOR MEDICARE OP, ALT 636 FOR OP/ED): Performed by: PHYSICIAN ASSISTANT

## 2025-07-28 PROCEDURE — 85300 ANTITHROMBIN III ACTIVITY: CPT | Performed by: PHYSICIAN ASSISTANT

## 2025-07-28 PROCEDURE — 96360 HYDRATION IV INFUSION INIT: CPT

## 2025-07-28 PROCEDURE — 80053 COMPREHEN METABOLIC PANEL: CPT | Performed by: PHYSICIAN ASSISTANT

## 2025-07-28 PROCEDURE — 2500000004 HC RX 250 GENERAL PHARMACY W/ HCPCS (ALT 636 FOR OP/ED): Performed by: PHYSICIAN ASSISTANT

## 2025-07-28 PROCEDURE — 85025 COMPLETE CBC W/AUTO DIFF WBC: CPT | Performed by: PHYSICIAN ASSISTANT

## 2025-07-28 PROCEDURE — 84439 ASSAY OF FREE THYROXINE: CPT | Performed by: PHYSICIAN ASSISTANT

## 2025-07-28 PROCEDURE — 84443 ASSAY THYROID STIM HORMONE: CPT | Performed by: PHYSICIAN ASSISTANT

## 2025-07-28 RX ORDER — ALBUTEROL SULFATE 90 UG/1
2 INHALANT RESPIRATORY (INHALATION) EVERY 4 HOURS PRN
Qty: 18 G | Refills: 0 | Status: SHIPPED | OUTPATIENT
Start: 2025-07-28 | End: 2025-08-27

## 2025-07-28 RX ORDER — IPRATROPIUM BROMIDE AND ALBUTEROL SULFATE 2.5; .5 MG/3ML; MG/3ML
3 SOLUTION RESPIRATORY (INHALATION) ONCE
Status: COMPLETED | OUTPATIENT
Start: 2025-07-28 | End: 2025-07-28

## 2025-07-28 RX ADMIN — SODIUM CHLORIDE 1000 ML: 900 INJECTION, SOLUTION INTRAVENOUS at 19:48

## 2025-07-28 RX ADMIN — IPRATROPIUM BROMIDE AND ALBUTEROL SULFATE 3 ML: 2.5; .5 SOLUTION RESPIRATORY (INHALATION) at 22:10

## 2025-07-28 ASSESSMENT — LIFESTYLE VARIABLES
EVER HAD A DRINK FIRST THING IN THE MORNING TO STEADY YOUR NERVES TO GET RID OF A HANGOVER: NO
HAVE YOU EVER FELT YOU SHOULD CUT DOWN ON YOUR DRINKING: NO
HAVE PEOPLE ANNOYED YOU BY CRITICIZING YOUR DRINKING: NO
TOTAL SCORE: 0
EVER FELT BAD OR GUILTY ABOUT YOUR DRINKING: NO

## 2025-07-28 ASSESSMENT — PAIN SCALES - GENERAL: PAINLEVEL_OUTOF10: 0 - NO PAIN

## 2025-07-28 ASSESSMENT — PAIN DESCRIPTION - PROGRESSION: CLINICAL_PROGRESSION: NOT CHANGED

## 2025-07-28 ASSESSMENT — PAIN - FUNCTIONAL ASSESSMENT: PAIN_FUNCTIONAL_ASSESSMENT: 0-10

## 2025-07-29 NOTE — ED PROVIDER NOTES
IN BRIEF    History: 24-year-old female presents with complaint of tightness in her chest and feeling of short of breath.  Patient states that she has been evaluated for this before with nothing found.  She has been found to be slightly tachycardic though in no acute distress.  She denies any history of asthma or lung issues.    Exam: Heart is regular rate and rhythm and lungs are clear to auscultation.  There is no chest wall tenderness to palpation       ED COURSE   MDM: Patient's tachycardia has improved with fluids.  Her lungs are clear to auscultation however will trial a breathing treatment to see if this helps her sensation of not being able to take a deep breath.  D-dimer is not elevated and remainder of her blood work is unremarkable.  Her minimally elevated TSH is not clinically significant and her free thyroxine is unremarkable.  Patient will be discharged with expectant management and follow-up with primary care.      I personally saw the patient and made/approved the management plan and take responsibility for the patient management.  Parts of this chart were completed with dictation software, please excuse any errors in transcription.     Zita Maloney DO, MPH  6:20 PM     Zita Maloney DO  07/29/25 3163

## 2025-07-29 NOTE — DISCHARGE INSTRUCTIONS
Please follow-up with your primary care physician closely.  Use albuterol inhaler as needed.  Return to the emergency department of worsening shortness of breath or chest pain

## 2025-07-30 NOTE — ED PROVIDER NOTES
HPI   Chief Complaint   Patient presents with    Chest Pain       HPI  24-year-old female presenting to the emergency department for evaluation of shortness of breath and chest discomfort.  Symptoms started this afternoon while she was at work.  Patient states she has had similar episodes in the past where she starts to feel short of breath and with this comes a tightness in her chest.  Upon arrival, she states that the shortness of breath has improved but is still present.  No vomiting, diarrhea, back pain or abdominal pain.  No previous cardiac history.  No palpitations.  No recent travel or prolonged immobilizations, no hemoptysis, no unilateral leg swelling    Please see HPI for pertinent positive and negative ROS.       Patient History   Medical History[1]  Surgical History[2]  Family History[3]  Social History[4]    Physical Exam   ED Triage Vitals [07/28/25 1801]   Temperature Heart Rate Respirations BP   36.5 °C (97.7 °F) (!) 116 16 140/76      Pulse Ox Temp Source Heart Rate Source Patient Position   97 % Temporal Monitor Sitting      BP Location FiO2 (%)     Right arm --       Physical Exam  GENERAL APPEARANCE: Awake and alert. No acute respiratory distress.  Patient is talking in smooth nondyspneic sentences  VITAL SIGNS: As per the nurses' triage record.  HEENT: Normocephalic, atraumatic.  NECK: Soft, nontender and supple  CHEST: Nontender to palpation. Clear to auscultation bilaterally. No rales, rhonchi, or wheezing. Symmetric rise and fall of chest wall.   HEART: Clear S1 and S2.  Tachycardic rate and regular rhythm. No murmurs appreciated on auscultation.  Strong and equal pulses in the extremities.  ABDOMEN: Soft, nontender, nondistended  MUSCULOSKELETAL: The calves are nontender to palpation. Full gross active range of motion. Ambulating on own with no acute difficulties  NEUROLOGICAL: Awake, alert and oriented x 3.  Patient answering questions appropriately.   IMMUNOLOGICAL: No lymphatic streaking  noted  DERMATOLOGIC: Warm and dry    PYSCH: Cooperative with appropriate mood and affect.    ED Course & MDM   ED Course as of 07/30/25 1959 Mon Jul 28, 2025 2011 EKG personally interpreted by me performed at 1803  Sinus tachycardia ventricular at 120 normal axis and intervals no acute ischemic changes [EF]   2123 Wells criteria for PE recorded 1.5 points for tachycardia.  Dimer is not elevated.  Therefore very low suspicion for PE and do not feel CTA is clinically indicated. []   2135 Improvement of microcytic anemia from CBC dating back to March 2025 []      ED Course User Index  [EF] Zita Maloney DO  [] Adelaide Hodge PA-C         Diagnoses as of 07/30/25 1959   Shortness of breath                 No data recorded                                 Medical Decision Making  Parts of this chart have been completed using voice recognition software. Please excuse any errors of transcription.  My thought process and reason for plan has been formulated from the time that I saw the patient until the time of disposition and is not specific to one specific moment during their visit and furthermore my MDM encompasses this entire chart and not only this text box.      HPI: Detailed above.    Exam: A medically appropriate exam performed, outlined above, given the known history and presentation.    History obtained from: Patient    EKG: See my supervising physician's EKG interpretation    Medications given during visit:  Medications   sodium chloride 0.9 % bolus 1,000 mL (0 mL intravenous Stopped 7/28/25 2032)   ipratropium-albuteroL (Duo-Neb) 0.5-2.5 mg/3 mL nebulizer solution 3 mL (3 mL nebulization Given 7/28/25 2210)        Diagnostic/tests  Labs Reviewed   CBC WITH AUTO DIFFERENTIAL - Abnormal       Result Value    WBC 10.4      nRBC 0.0      RBC 5.00      Hemoglobin 11.3 (*)     Hematocrit 37.0      MCV 74 (*)     MCH 22.6 (*)     MCHC 30.5 (*)     RDW 16.2 (*)     Platelets 404      Neutrophils % 72.1       Immature Granulocytes %, Automated 0.6      Lymphocytes % 18.4      Monocytes % 7.8      Eosinophils % 0.9      Basophils % 0.2      Neutrophils Absolute 7.47      Immature Granulocytes Absolute, Automated 0.06      Lymphocytes Absolute 1.91      Monocytes Absolute 0.81      Eosinophils Absolute 0.09      Basophils Absolute 0.02     TSH WITH REFLEX TO FREE T4 IF ABNORMAL - Abnormal    Thyroid Stimulating Hormone 4.14 (*)     Narrative:     TSH testing is performed using different testing methodology at [facility] than at other [facility] hospitals. Direct result comparisons should only be made within the same method.     COMPREHENSIVE METABOLIC PANEL - Normal    Glucose 84      Sodium 139      Potassium 4.0      Chloride 107      Bicarbonate 23      Anion Gap 13      Urea Nitrogen 11      Creatinine 0.54      eGFR >90      Calcium 9.7      Albumin 4.2      Alkaline Phosphatase 89      Total Protein 7.7      AST 11      Bilirubin, Total 0.4      ALT 15     D-DIMER, NON VTE - Normal    D-Dimer Non VTE, Quant (mg/L FEU) 0.27      Narrative:     THROMBOEMBOLIC EVENTS CANNOT BE EXCLUDED SOLELY ON THE BASIS OF THE D-DIMER LEVEL BEING WITHIN THE NORMAL REFERENCE RANGE. D-DIMER LEVELS LESS THAN 0.5 MG/L FEU IN CONJUNCTION WITH A LOW CLINICAL PROBABILITY HAVE AN EXCELLENT NEGATIVE PREDICTIVE VALUE IN EXCLUDING A DIAGNOSIS OF PULMONARY EMBOLUS (PE) OR DEEP VEIN THROMBOSIS (DVT). ELEVATED D-DIMER LEVELS ARE NOT SPECIFIC TO PE OR DVT, AND MAY BE SEEN IN PATIENTS WITH DIC, ADVANCED AGE, PREGNANCY, MALIGNANCY, LIVER DISEASE, INFECTION, AND INFLAMMATORY CONDITIONS AMONG OTHERS. D-DIMER LEVELS MAY BE DECREASED IN PATIENTS RECEIVING ANTI-COAGULATION THERAPY.   SERIAL TROPONIN-INITIAL - Normal    Troponin I, High Sensitivity <3      Narrative:     Less than 99th percentile of normal range cutoff-  Female and children under 18 years old <14 ng/L; Male <21 ng/L: Negative  Repeat testing should be performed if clinically  indicated.     Female and children under 18 years old 14-50 ng/L; Male 21-50 ng/L:  Consistent with possible cardiac damage and possible increased clinical   risk. Serial measurements may help to assess extent of myocardial damage.     >50 ng/L: Consistent with cardiac damage, increased clinical risk and  myocardial infarction. Serial measurements may help assess extent of   myocardial damage.      NOTE: Children less than 1 year old may have higher baseline troponin   levels and results should be interpreted in conjunction with the overall   clinical context.     NOTE: Troponin I testing is performed using a different   testing methodology at [facility] than at other   [facility]. Direct result comparisons should only   be made within the same method.   HUMAN CHORIONIC GONADOTROPIN, SERUM QUANTITATIVE - Normal    HCG, Beta-Quantitative <2      Narrative:      Total HCG measurement is performed using the [employer] Access   Immunoassay which detects intact HCG and free beta HCG subunit.    This test is not indicated for use as a tumor marker.   HCG testing is performed using a different test methodology at [facility] than other [facility]. Direct result comparison   should only be made within the same method.       SERIAL TROPONIN, 1 HOUR - Normal    Troponin I, High Sensitivity <3      Narrative:     Less than 99th percentile of normal range cutoff-  Female and children under 18 years old <14 ng/L; Male <21 ng/L: Negative  Repeat testing should be performed if clinically indicated.     Female and children under 18 years old 14-50 ng/L; Male 21-50 ng/L:  Consistent with possible cardiac damage and possible increased clinical   risk. Serial measurements may help to assess extent of myocardial damage.     >50 ng/L: Consistent with cardiac damage, increased clinical risk and  myocardial infarction. Serial measurements may help assess extent of   myocardial damage.       NOTE: Children less than 1 year old may have higher baseline troponin   levels and results should be interpreted in conjunction with the overall   clinical context.     NOTE: Troponin I testing is performed using a different   testing methodology at [facility] than at other   [facility]. Direct result comparisons should only   be made within the same method.   THYROXINE, FREE - Normal    Thyroxine, Free 0.79      Narrative:     Thyroxine Free testing is performed using different testing methodology at [facility] than at other [facility]. Direct result comparisons should only be made within the same method.    Biotin can cause falsely elevated free T4 results. Patients taking a Biotin dose of up to 10 mg/day should refrain from taking Biotin for 24 hours before sample collection. Patient taking a Biotin dose of >10 mg/day should consult with their physician or the laboratory before the blood draw.   TROPONIN SERIES- (INITIAL, 1 HR)    Narrative:     The following orders were created for panel order Troponin I Series, High Sensitivity (0, 1 HR).  Procedure                               Abnormality         Status                     ---------                               -----------         ------                     Troponin I, High Sensiti...[[id number]]  Normal              Final result               Troponin, High Sensitivi...[[id number]]  Normal              Final result                 Please view results for these tests on the individual orders.      XR chest 2 views   Final Result   No evidence of acute intrathoracic abnormality.        Signed by: Rock Arceo 7/28/2025 6:47 PM   Dictation workstation:   [id number]           Considerations/further MDM:  Patient was seen in conjucntion with my supervising physician,  Dr. Maloney. Please refer to her note.    Differential diagnosis includes was not limited to pneumonia versus pneumothorax versus PE versus ACS versus  worsening microcytic anemia    Initial troponin less than 3.  Repeat troponin less than 3.  Heart score 0.  D-dimer is not elevated at 0.27.  Wells criteria for PE is 1.5 points due to tachycardia which did resolve.  Low suspicion for PE do not feel CT imaging is clinically indicated.  CBC shows a microcytic anemia but it has improved from previous CBCs.  CMP unremarkable.  Negative pregnancy test.  TSH mildly elevated with unremarkable T4.  Chest x-ray shows no evidence of acute cardiopulmonary process.  Patient was given DuoNeb in the emerged part with improvement of her symptoms.  She was discharged with instructions follow-up with her primary care physician.  Return precautions were discussed.  She was discharged in stable condition.      Procedure  Procedures       [1] No past medical history on file.  [2]   Past Surgical History:  Procedure Laterality Date    TONSILLECTOMY  09/11/2017    Tonsillectomy With Adenoidectomy    TYMPANOSTOMY TUBE PLACEMENT  09/11/2017    Ear Pressure Equalization Tube   [3] No family history on file.  [4]   Social History  Tobacco Use    Smoking status: Never    Smokeless tobacco: Never   Vaping Use    Vaping status: Some Days   Substance Use Topics    Alcohol use: Never    Drug use: Never        Adelaide Hodge PA-C  07/30/25 2002